# Patient Record
Sex: MALE | Race: WHITE
[De-identification: names, ages, dates, MRNs, and addresses within clinical notes are randomized per-mention and may not be internally consistent; named-entity substitution may affect disease eponyms.]

---

## 2019-06-18 ENCOUNTER — HOSPITAL ENCOUNTER (OUTPATIENT)
Dept: HOSPITAL 84 - D.OPS | Age: 19
Discharge: HOME | End: 2019-06-18
Attending: ORTHOPAEDIC SURGERY
Payer: COMMERCIAL

## 2019-06-18 VITALS — BODY MASS INDEX: 31.99 KG/M2 | WEIGHT: 216 LBS | BODY MASS INDEX: 31.99 KG/M2 | HEIGHT: 69 IN

## 2019-06-18 VITALS — SYSTOLIC BLOOD PRESSURE: 138 MMHG | DIASTOLIC BLOOD PRESSURE: 65 MMHG

## 2019-06-18 DIAGNOSIS — S92.511A: Primary | ICD-10-CM

## 2019-06-18 DIAGNOSIS — Z01.812: ICD-10-CM
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
## 2019-06-18 NOTE — NUR
PATIENT AMBULATES AROUND ROOM WITH CRUTCHES, TOUCH DOWN
WEIGHT-BEARING TO RLE ONLY AT THIS TIME, USES CRUTCHES WITHOUT
DIFFICULTY, DENIES UNSTEADINESS OR DIZZINESS.  DISCHARGE INSTRUCTIONS
REVIEWED WITH PATIENT AND FATHER AND GIRLFRIEND.
DISCHARGED HOME VIA WHEELCHAIR TO PRIVATE VEHICLE WITH GIRLFRIEND AT
1900

## 2019-06-18 NOTE — OP
PATIENT NAME:  OSBALDO VALLADARES                MEDICAL RECORD: [id number]
:89                                             LOCATION:D.OPS          
                                                         ADMISSION DATE:        
SURGEON:  RYAN HOLDER DO         
 
 
DATE OF OPERATION:  2019
 
PROCEDURE PERFORMED:  Right fourth toe PIP fusion.
 
PREOPERATIVE DIAGNOSIS:  Right fourth toe proximal phalanx fracture, comminuted
and displaced.
 
INDICATIONS:  Mr. Valladares is a 18-year-old male who kicked in a couch after
chasing a dog.  He had immediate pain and x-rays were taken.  He had a severely
comminuted and displaced proximal phalanx fracture of the fourth toe.  He was
seen in my clinic and I told him we can probably percutaneously pin it or closed
reduction with perc pinning, and if we can do that, we would put a plate on it. 
He is aware of that risk and aware of the risks of infection, bleeding, damage
to nerves or vessels, need for further surgery.  He signed the consent.
 
SURGEON:  Ryan Holder DO
 
DESCRIPTION OF PROCEDURE:  The patient was taken to the operative suite.  He was
given a block by anesthesia preoperatively and given 2 grams of Ancef
preoperatively.  He did not have a reaction to it, even though he had penicillin
allergy.  He was laid in the supine position, given general anesthetic, and LMA
was placed.  The right lower extremity was prepped and draped in sterile
fashion.  A time-out was performed and everyone was in agreement with correct
side, site, patient, and procedure.  The procedure was then began trying to
reduce the toe and it did not reduce easily.  I ended up doing a small incision
and tried to reduce it again, it did not reduce.  Then, I ended up opening it
and seeing that he had a split T-type fracture through the joint line of the
proximal phalanx into the middle phalanx of the fourth toe.  This was tried to
be pieced together.  After several attempts of failing, I ended up doing the
only thing possible that was salvageable, was a fusion at the PIP.  The condyles
were removed as they were split and not usable, had a very distal fracture.  The
rongeur was taken and the articular surface was taken off the proximal portion
of the middle phalanx.  Then, a pin was put through the distal end and through
the PIP joint, holding it into place in a good location as far as rotation wise
in flexion and extension.  Then, a 4-hole 1.3-mm plate from [employer] was put on
with 2 screws in the middle phalanx and 2 screws in the proximal phalanx.  Then,
the space that was formed after the condyles were removed was stuffed with
cancellous bone chips.  This was then irrigated and closed with 4-0 Monocryl in
a horizontal mattress fashion.  He was then dressed with Adaptic, 4 x 4, cast
padding, and Ace; awakened; and taken to recovery with a boot put on his foot in
stable condition.
 
BLOOD LOSS:  Minimal.
 
COMPLICATIONS:  None.
 
TRANSINT:[id number] Voice Confirmation ID: [id number] DOCUMENT ID: [id number]
 
 
 
OPERATIVE REPORT                               [id number]    OSBALDO VALLADARES MICHAEL D, DO         
 
 
 
Electronically Signed by RYAN VALDOVINOS on 19 at 1947
 
 
 
 
 
 
 
 
 
 
 
 
 
 
 
 
 
 
 
 
 
 
 
 
 
 
 
 
 
 
 
 
 
 
 
 
 
 
 
 
 
CC:                                                             [id number]
DICTATION DATE: 19 165     :     19      [facility]                                          
 [place] [address]